# Patient Record
Sex: FEMALE | URBAN - METROPOLITAN AREA
[De-identification: names, ages, dates, MRNs, and addresses within clinical notes are randomized per-mention and may not be internally consistent; named-entity substitution may affect disease eponyms.]

---

## 2022-08-10 ENCOUNTER — OFFICE VISIT (OUTPATIENT)
Facility: LOCATION | Age: 67
End: 2022-08-10
Payer: COMMERCIAL

## 2022-08-10 DIAGNOSIS — H26.493 OTHER SECONDARY CATARACT, BILATERAL: Primary | ICD-10-CM

## 2022-08-10 PROCEDURE — 99204 OFFICE O/P NEW MOD 45 MIN: CPT | Performed by: OPHTHALMOLOGY

## 2022-08-10 ASSESSMENT — INTRAOCULAR PRESSURE
OD: 17
OS: 17

## 2022-08-10 ASSESSMENT — VISUAL ACUITY
OS: 20/20
OD: 20/25

## 2022-08-10 ASSESSMENT — KERATOMETRY
OD: 42.88
OS: 42.50

## 2022-08-10 NOTE — IMPRESSION/PLAN
Impression: Other secondary cataract, bilateral: H26.493. vision worsening, affecting ADL, may improve with surgery Plan: Discussed risk/benefit/alternative to yag-capsulotomy, including risk of IOL dislocation and retinal detachment, pt wishes to proceed. ok for yag-cap OD then OS in [place] [address] RL1.

## 2022-09-22 ENCOUNTER — POST-OPERATIVE VISIT (OUTPATIENT)
Facility: LOCATION | Age: 67
End: 2022-09-22
Payer: COMMERCIAL

## 2022-09-22 DIAGNOSIS — Z48.810 ENCOUNTER FOR SURGICAL AFTERCARE FOLLOWING SURGERY ON A SENSE ORGAN: Primary | ICD-10-CM

## 2022-09-22 PROCEDURE — 99024 POSTOP FOLLOW-UP VISIT: CPT | Performed by: OPTOMETRIST

## 2022-09-22 ASSESSMENT — INTRAOCULAR PRESSURE
OS: 17
OD: 16

## 2022-09-22 ASSESSMENT — VISUAL ACUITY
OS: 20/25
OD: 20/20

## 2022-09-22 NOTE — IMPRESSION/PLAN
Impression: S/P YAG Capsulotomy (Yttrium Aluminum Jay) OD - 10 Days. Encounter for surgical aftercare following surgery on a sense organ  Z48.810. Plan: Patient continues to heal well from YAG PC. No inflammation present today. VA improved. Monitor.  KNA for YAG PC OS

## 2022-10-03 ENCOUNTER — POST-OPERATIVE VISIT (OUTPATIENT)
Facility: LOCATION | Age: 67
End: 2022-10-03
Payer: COMMERCIAL

## 2022-10-03 DIAGNOSIS — Z48.810 ENCOUNTER FOR SURGICAL AFTERCARE FOLLOWING SURGERY ON A SENSE ORGAN: Primary | ICD-10-CM

## 2022-10-03 PROCEDURE — 99024 POSTOP FOLLOW-UP VISIT: CPT | Performed by: OPTOMETRIST

## 2022-10-03 ASSESSMENT — VISUAL ACUITY
OS: 20/25
OD: 20/25

## 2022-10-03 ASSESSMENT — INTRAOCULAR PRESSURE
OD: 16
OS: 16

## 2024-02-23 ENCOUNTER — OFFICE VISIT (OUTPATIENT)
Facility: LOCATION | Age: 69
End: 2024-02-23
Payer: COMMERCIAL

## 2024-02-23 DIAGNOSIS — Z96.1 PRESENCE OF INTRAOCULAR LENS: ICD-10-CM

## 2024-02-23 DIAGNOSIS — H43.12 VITREOUS HEMORRHAGE, LEFT EYE: ICD-10-CM

## 2024-02-23 DIAGNOSIS — H04.123 TEAR FILM INSUFFICIENCY OF BILATERAL LACRIMAL GLANDS: ICD-10-CM

## 2024-02-23 DIAGNOSIS — R73.03 BORDERLINE DIABETES: Primary | ICD-10-CM

## 2024-02-23 PROCEDURE — 92014 COMPRE OPH EXAM EST PT 1/>: CPT

## 2024-02-23 PROCEDURE — 92134 CPTRZ OPH DX IMG PST SGM RTA: CPT

## 2024-02-23 ASSESSMENT — KERATOMETRY
OD: 42.50
OS: 44.63

## 2024-02-23 ASSESSMENT — INTRAOCULAR PRESSURE
OS: 14
OD: 14

## 2024-02-23 ASSESSMENT — VISUAL ACUITY
OS: 20/20
OD: 20/20